# Patient Record
Sex: MALE
[De-identification: names, ages, dates, MRNs, and addresses within clinical notes are randomized per-mention and may not be internally consistent; named-entity substitution may affect disease eponyms.]

---

## 2024-04-25 ENCOUNTER — APPOINTMENT (OUTPATIENT)
Dept: NEUROLOGY | Facility: CLINIC | Age: 39
End: 2024-04-25

## 2024-04-25 PROBLEM — Z00.00 ENCOUNTER FOR PREVENTIVE HEALTH EXAMINATION: Status: ACTIVE | Noted: 2024-04-25

## 2024-05-02 ENCOUNTER — APPOINTMENT (OUTPATIENT)
Dept: NEUROLOGY | Facility: CLINIC | Age: 39
End: 2024-05-02
Payer: COMMERCIAL

## 2024-05-02 DIAGNOSIS — G47.33 OBSTRUCTIVE SLEEP APNEA (ADULT) (PEDIATRIC): ICD-10-CM

## 2024-05-02 PROCEDURE — 99203 OFFICE O/P NEW LOW 30 MIN: CPT

## 2024-05-02 NOTE — REASON FOR VISIT
[Home] : at home, [unfilled] , at the time of the visit. [Medical Office: ([facility])___] : at the medical office located in  [Patient] : the patient [Initial Evaluation] : an initial evaluation

## 2024-05-16 PROBLEM — G47.33 OSA (OBSTRUCTIVE SLEEP APNEA): Status: ACTIVE | Noted: 2024-05-16

## 2024-05-16 NOTE — PHYSICAL EXAM
[FreeTextEntry1] : General: Constitutional:  Sitting comfortably in NAD. Psychiatric: well-groomed, appropriate affect, insight/judgment intact  Mallampati: 3-4  Cognitive: Orientation, language, memory and knowledge screens intact. No expressive or receptive errors.  Cranial Nerves: VII: Face appears symmetric   Motor: Power: no pronator drift.

## 2024-05-16 NOTE — DISCUSSION/SUMMARY
[FreeTextEntry1] : Impression: 1) AMANDA, moderate per nightowl  Not tolerating CPAP well, though when used he did tend to feel more refreshed.  Looking for other options.   Plan: 1) referral to ENT/[facility] for mandibular advancement options, Dr. Barone; may be able to drop AHI 5-10. 2) counseled re weight loss may also be sufficient to improve AMANDA significantly.

## 2024-05-16 NOTE — HISTORY OF PRESENT ILLNESS
[FreeTextEntry1] :  Alexsander is a 40 yo man with a history of obstructive sleep apnea.  He believes he has had symptoms for a long time, but was tested recently which showed moderate AMANDA. Nightowl, AHI 17, Min O2 87%,  Tried CPAP APAP, nasal pillows. Tried it for 6 months and able to sleep with it.   Did feel a bit better in the mornings. Traveling was an issue.  BMI 26 Side sleeper, mouth breather.  Sleep Routine: Retires:    10-midnight pm Latency:     quick Arousals:    feels throat being very dry 2-3x/night, sleeps fairly quickly Awakens:    7am Naps:   not really Snoring/air hunger:  yes and rarely has had air hunger, gasping for air.  sleep walking as a child  eye allergies No nasal allergies.  Sleep paralysis:  no Hypnagogic hypnopompic hallucinations:  no Cataplexy:  no   Fossil:  5 Sleep/wake aids attempted/failed:  melatonin Substances/Habits:   Employment/Schedule: Familial Sleep traits: Exposures/etiologies: